# Patient Record
Sex: FEMALE | Race: BLACK OR AFRICAN AMERICAN
[De-identification: names, ages, dates, MRNs, and addresses within clinical notes are randomized per-mention and may not be internally consistent; named-entity substitution may affect disease eponyms.]

---

## 2021-04-04 ENCOUNTER — HOSPITAL ENCOUNTER (EMERGENCY)
Dept: HOSPITAL 53 - M ED | Age: 36
Discharge: HOME | End: 2021-04-04
Payer: COMMERCIAL

## 2021-04-04 VITALS — WEIGHT: 186.51 LBS | HEIGHT: 65 IN | BODY MASS INDEX: 31.07 KG/M2

## 2021-04-04 VITALS — SYSTOLIC BLOOD PRESSURE: 135 MMHG | DIASTOLIC BLOOD PRESSURE: 82 MMHG

## 2021-04-04 DIAGNOSIS — R51.9: ICD-10-CM
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
## 2021-04-04 DIAGNOSIS — S43.51XA: Primary | ICD-10-CM

## 2021-04-04 NOTE — REP
INDICATION:

fall, injury.



COMPARISON:

None.



TECHNIQUE:

There are three views.



FINDINGS:

Mineralization is normal.

The acromioclavicular and glenohumeral articulations are unremarkable.

There is no fracture or dislocation.

There no calcifications or foreign bodies.



IMPRESSION:

Essentially negative right shoulder.





<Electronically signed by Kareem Squires > 04/04/21 [id number]